# Patient Record
Sex: FEMALE | Race: WHITE | NOT HISPANIC OR LATINO | Employment: FULL TIME | URBAN - METROPOLITAN AREA
[De-identification: names, ages, dates, MRNs, and addresses within clinical notes are randomized per-mention and may not be internally consistent; named-entity substitution may affect disease eponyms.]

---

## 2023-08-15 ENCOUNTER — OFFICE VISIT (OUTPATIENT)
Dept: BARIATRICS | Facility: CLINIC | Age: 38
End: 2023-08-15
Payer: COMMERCIAL

## 2023-08-15 VITALS
SYSTOLIC BLOOD PRESSURE: 120 MMHG | HEIGHT: 64 IN | WEIGHT: 262.6 LBS | DIASTOLIC BLOOD PRESSURE: 80 MMHG | HEART RATE: 90 BPM | BODY MASS INDEX: 44.83 KG/M2

## 2023-08-15 DIAGNOSIS — Z78.9 KETOGENIC DIET: ICD-10-CM

## 2023-08-15 DIAGNOSIS — E66.01 CLASS 3 SEVERE OBESITY DUE TO EXCESS CALORIES WITHOUT SERIOUS COMORBIDITY WITH BODY MASS INDEX (BMI) OF 45.0 TO 49.9 IN ADULT (HCC): Primary | ICD-10-CM

## 2023-08-15 PROCEDURE — 99204 OFFICE O/P NEW MOD 45 MIN: CPT | Performed by: NURSE PRACTITIONER

## 2023-08-15 NOTE — PROGRESS NOTES
Assessment/Plan:    Class 3 severe obesity due to excess calories without serious comorbidity with body mass index (BMI) of 45.0 to 49.9 in adult [facility])  - Discussed options of HealthyCORE-Intensive Lifestyle Intervention Program, Very Low Calorie Diet-VLCD, Conservative Program, Brian-En-Y Gastric Bypass and Vertical Sleeve Gastrectomy and the role of weight loss medications. - Explained the importance of making lifestyle changes first before starting anti-obesity medications. - Patient should demonstrate lifestyle changes first before anti-obesity medication initiated. - Patient is interested in pursuing Very Low Calorie Diet-VLCD and follow up visits with medical weight management provider. - Initial weight loss goal of 5-10% weight loss for improved health  - Weight loss can improve patient's co-morbid conditions and/or prevent weight-related complications. - Labs reviewed from 3/2023 - scanned into chart  -Patient lifestyle habits were reviewed and patient will proceed with very low calorie diet to start her weight loss journey. VLCD Review:  Contraindications: no  Labs ordered: yes  EKG ordered: yes  HTN meds addressed: n/a  DM2 meds addressed: n/a  VLCD time restriction based on BMI: no  LMP/OCP: will need pregnancy test prior to starting         Watt Aly was seen today for consult. Diagnoses and all orders for this visit:    Class 3 severe obesity due to excess calories without serious comorbidity with body mass index (BMI) of 45.0 to 49.9 in adult ([facility])  -     CBC and differential; Future  -     Comprehensive metabolic panel; Future  -     TSH, 3rd generation with Free T4 reflex; Future  -     Magnesium; Future  -     Uric acid; Future  -     Insulin, fasting; Future  -     ECG 12 lead; Future    Ketogenic diet  -     CBC and differential; Future  -     Comprehensive metabolic panel; Future  -     TSH, 3rd generation with Free T4 reflex; Future  -     Magnesium; Future  -     Uric acid;  Future  - Insulin, fasting; Future  -     ECG 12 lead; Future           Total time spent reviewing chart, interviewing patient, examining patient, discussing plan, answering all questions, and documentin min, with >50% face-to-face time spent counseling patient on nonsurgical interventions for the treatment of excess weight. Discussed in detail nonsurgical options including intensive lifestyle intervention program, very low-calorie diet program and conservative program.  Discussed the role of weight loss medications. Counseled patient on diet behavior and exercise modification for weight loss. Follow up in approximately 6-8 weeks into [facility] program with Non-Surgical Physician/Advanced Practitioner. Subjective:   Chief Complaint   Patient presents with   • Consult     Pt is here for MWM consult       Patient ID: Imelda Ashley  is a 44 y.o. female with excess weight/obesity here to pursue weight management. Previous notes and records have been reviewed. History reviewed. No pertinent past medical history. History reviewed. No pertinent surgical history. HPI:  Wt Readings from Last 20 Encounters:   08/15/23 119 kg (262 lb 9.6 oz)       Patient presents today to medical weight management office for consult. Patient had always been athletic but around 15 she stopped doing sports and started gaining some weight. She has been successful at losing weight by doing CrossFit and eating well but after she stopped the weight came on plus more. She has tried many different diets and lifestyles but has not been successful and only continues to gain weight. Patient is not interested in bariatric surgery but wants to get her lifestyle back on track. Patient states she has been feeling very defeated and has lost her motivation to prepare healthy meals or cook at all. She would like to consider the very low calorie diet to reset her lifestyle.     Obesity/Excess Weight:  Severity: Severe  Onset:  lifelong Modifiers: Diet and Exercise  Contributing factors: Poor Food Choices, Insufficient Physical Activity, Stress/Emotional Eating and Depression  Associated symptoms: comorbid conditions, fatigue, increased joint pain, decreased exercise capacity, body image issues, decreased self esteem, increased shortness of breath, decreased mobility, depression, inability to do certain activities and clothes do not fit    Diet recall:  B: yogurt OR oatmeal OR cereal OR egg bites  S: (grazes throughout the day) nuts/granola, tortilla chips and salsa, chocolate  L: salad kit OR leftovers  D: burgers OR chicken with salad and starch  S: sometimes a sweet    Hydration: water - 3-4 cups, coffee  Alcohol: occasionally  Smoking: no  Exercise: starting body weight exercises, pilates  Occupation:   Sleep: well  STOP ban/8      Current weight: 262.6 lbs  Goal weight: 160 lbs      The following portions of the patient's history were reviewed and updated as appropriate: allergies, current medications, past family history, past medical history, past social history, past surgical history, and problem list.    Family History   Problem Relation Age of Onset   • Hypertension Father         Review of Systems   Constitutional: Negative for fatigue. HENT: Negative for sore throat. Respiratory: Negative for cough and shortness of breath. Cardiovascular: Negative for chest pain, palpitations and leg swelling. Gastrointestinal: Negative for abdominal pain, constipation, diarrhea and nausea.        + GERD   Genitourinary: Negative for dysuria. Musculoskeletal: Negative for arthralgias and back pain. Skin: Negative for rash. Neurological: Negative for headaches. Psychiatric/Behavioral: Positive for dysphoric mood. The patient is not nervous/anxious.         Objective:  /80   Pulse 90   Ht 5' 4" (1.626 m)   Wt 119 kg (262 lb 9.6 oz)   LMP 2023 (Exact Date)   BMI 45.08 kg/m²     Physical Exam  Vitals and nursing note reviewed. Constitutional:       Appearance: Normal appearance. She is obese. HENT:      Head: Normocephalic. Pulmonary:      Effort: Pulmonary effort is normal.   Neurological:      General: No focal deficit present. Mental Status: She is alert and oriented to person, place, and time. Psychiatric:         Mood and Affect: Mood normal.         Behavior: Behavior normal.         Thought Content: Thought content normal.         Judgment: Judgment normal.                Labs and Imaging  Recent labs and imaging have been personally reviewed.   No results found for: "WBC", "HGB", "HCT", "MCV", "PLT"  No results found for: "NA", "SODIUM", "K", "CL", "CO2", "ANIONGAP", "AGAP", "BUN", "CREATININE", "GLUC", "GLUF", "CALCIUM", "AST", "ALT", "ALKPHOS", "PROT", "TP", "BILITOT", "TBILI", "EGFR"  No results found for: "HGBA1C"  No results found for: "JJC9ELOHYUAP", "TSH"  No results found for: "CHOLESTEROL"  No results found for: "HDL"  No results found for: "TRIG"  No results found for: "LDLCALC"

## 2023-08-16 NOTE — ASSESSMENT & PLAN NOTE
- Discussed options of HealthyCORE-Intensive Lifestyle Intervention Program, Very Low Calorie Diet-VLCD, Conservative Program, Brian-En-Y Gastric Bypass and Vertical Sleeve Gastrectomy and the role of weight loss medications. - Explained the importance of making lifestyle changes first before starting anti-obesity medications. - Patient should demonstrate lifestyle changes first before anti-obesity medication initiated. - Patient is interested in pursuing Very Low Calorie Diet-VLCD and follow up visits with medical weight management provider. - Initial weight loss goal of 5-10% weight loss for improved health  - Weight loss can improve patient's co-morbid conditions and/or prevent weight-related complications. - Labs reviewed from 3/2023 - scanned into chart  -Patient lifestyle habits were reviewed and patient will proceed with very low calorie diet to start her weight loss journey.     VLCD Review:  Contraindications: no  Labs ordered: yes  EKG ordered: yes  HTN meds addressed: n/a  DM2 meds addressed: n/a  VLCD time restriction based on BMI: no  LMP/OCP: will need pregnancy test prior to starting